# Patient Record
Sex: FEMALE | Race: WHITE | URBAN - METROPOLITAN AREA
[De-identification: names, ages, dates, MRNs, and addresses within clinical notes are randomized per-mention and may not be internally consistent; named-entity substitution may affect disease eponyms.]

---

## 2022-01-12 ENCOUNTER — MEDICAL CORRESPONDENCE (OUTPATIENT)
Dept: HEALTH INFORMATION MANAGEMENT | Facility: CLINIC | Age: 32
End: 2022-01-12

## 2022-01-15 ENCOUNTER — TRANSCRIBE ORDERS (OUTPATIENT)
Dept: OTHER | Age: 32
End: 2022-01-15

## 2022-01-15 DIAGNOSIS — E77.0: Primary | ICD-10-CM

## 2022-03-28 ENCOUNTER — TELEPHONE (OUTPATIENT)
Dept: CONSULT | Facility: CLINIC | Age: 32
End: 2022-03-28
Payer: COMMERCIAL

## 2022-03-28 ENCOUNTER — VIRTUAL VISIT (OUTPATIENT)
Dept: CONSULT | Facility: CLINIC | Age: 32
End: 2022-03-28
Attending: PEDIATRICS
Payer: COMMERCIAL

## 2022-03-28 ENCOUNTER — VIRTUAL VISIT (OUTPATIENT)
Dept: PEDIATRICS | Facility: CLINIC | Age: 32
End: 2022-03-28
Attending: GENETIC COUNSELOR, MS
Payer: COMMERCIAL

## 2022-03-28 DIAGNOSIS — R11.10 VOMITING, INTRACTABILITY OF VOMITING NOT SPECIFIED, PRESENCE OF NAUSEA NOT SPECIFIED, UNSPECIFIED VOMITING TYPE: ICD-10-CM

## 2022-03-28 DIAGNOSIS — E77.0: Primary | ICD-10-CM

## 2022-03-28 DIAGNOSIS — D23.9: ICD-10-CM

## 2022-03-28 DIAGNOSIS — E77.0: ICD-10-CM

## 2022-03-28 DIAGNOSIS — R04.0 EPISTAXIS: ICD-10-CM

## 2022-03-28 DIAGNOSIS — E75.21 FABRY DISEASE (H): Primary | ICD-10-CM

## 2022-03-28 DIAGNOSIS — Z31.5 ENCOUNTER FOR PROCREATIVE GENETIC COUNSELING: ICD-10-CM

## 2022-03-28 DIAGNOSIS — Z71.83 ENCOUNTER FOR NONPROCREATIVE GENETIC COUNSELING: ICD-10-CM

## 2022-03-28 DIAGNOSIS — R20.2 PARESTHESIA: ICD-10-CM

## 2022-03-28 DIAGNOSIS — R10.84 ABDOMINAL PAIN, GENERALIZED: ICD-10-CM

## 2022-03-28 PROCEDURE — 96040 HC GENETIC COUNSELING, EACH 30 MINUTES: CPT | Mod: GT,95 | Performed by: GENETIC COUNSELOR, MS

## 2022-03-28 PROCEDURE — 99204 OFFICE O/P NEW MOD 45 MIN: CPT | Mod: 95 | Performed by: PEDIATRICS

## 2022-03-28 NOTE — LETTER
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 She was referred for evaluation by Dr. Jessica Jaquez ([facility], Family Medicine with OB), and was seen virtually today at the [facility] by Dr. Jose Witt. Beryl was seen at today's video appointment with her spouse Rangel. I met with Beryl virtually today at the request of Dr. Witt to obtain family history, discuss possible genetic contributions to her symptoms, and to obtain informed consent for genetic testing.     Monalisa noted main questions today around her diagnosis and if she should have additional or repeat Fabry disease testing, and what management/follow up would be recommended for her son once he is born.    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 She notes foot burning/paresthesia starting in early childhood (~7 years of age) with multiple angiokeratomas with time. She also notes chronic GI symptoms including upset stomach and vomiting. 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 Past notes state normal GLA genetic testing and normal alpha galactosidase level testing from [facility] in ~2010, though we do not have copies for review. I will email a release of records form to Beryl to attempt to obtain copies. She previously followed with a hematologist/oncologist at [facility]. Beryl notes she has been fairly stable/not much changing the past 5 years. She was referred to our clinic partially due to her current pregnancy (MALIKA in 8/2022).     Beryl recently saw genetic counselor Anabelle Maza, [facility]. This 3/15/2022 clinic note is available through [employer].    Additional history includes bleeding gums and epistaxis. " Per past clinic notes full gene analysis of ENG and ACVRL1 was reportedly negative at [place]; report copies are unavailable for review.    See Dr. Witt note for full personal history details.     Family History:   A three generation pedigree was obtained today and sent to be scanned into the EMR. The family history was significant for the following:    Beryl is currently pregnant with a male, with MALIKA in 8/21/2022.    Beryl has one sister and one brother, who are healthy. They have no children at this time.  Beryl's mother has no major health concerns. Her mother has one brother and one sister. Her brother has a history of a developmental condition (exact diagnosis unknown). Beryl noted he was not thought to have a genetic condition, but additional information regarding his exact diagnosis and any testing done may give more information about any recurrence chance for other family members. If this is information can be determined, I encouraged Beryl to reach out. Beryl's mother's sister and her two children have no major health concerns.    Beryl's maternal grandmother has no major health concerns. Beryl's maternal grandfather has a history of COPD and heart failure at an older age.    Beryl's father has a history of hypertension. He has one sister. Her and her child have no major health concerns.    Beryl's paternal grandparents have no major health concerns.    The family history was otherwise negative for similar history, heart and kidney concerns, and known genetic conditions.     Beryl is of [place] ancestry on her maternal side and [place]/Citizen of [place] ancestry of her paternal side. Consanguinity was denied.    Discussion:   We discussed that genes are long stretches of DNA that are responsible for how our bodies look and how our bodies work.  Our genes are inherited on structures called chromosomes of which we have 23 pairs. We have two copies of chromosomes 1-22, and either two X chromosomes (females) or  "one X and one Y chromosome (males). One copy of each chromosome in a pair is inherited from the mother and one is inherited from the father. Changes in the DNA sequence of a gene, called mutations, as well as changes within the chromosomes can cause the signs and symptoms of a genetic condition.     Monalisa was well aware of the symptoms of Fabry disease, and they were therefore not reviewed by myself today. She also recently met with a genetic counselor at [facility] and denied the need for review of the genetics of Fabry disease, so they below genetic information was only briefly reviewed today.    We briefly reviewed that Fabry disease is a genetic condition caused by mutations in a gene called GLA which is found on the X-chromosome. This gene codes for a lysosomal protein called alpha-galactosidase A. This enzyme is normally responsible for breaking down a fatty substance called globotriaosylceramide in the lysosomes of cells. Mutations in the GLA gene, therefore, disrupt this degradation process and lead to the accumulation of globotriaosylceramide. This damages cells and can lead to the signs and symptoms of Fabry disease.      X-linked inheritance was briefly reviewed. We reviewed how women have two copies of the X-chromosome while men have one copy of the X-chromosome and one copy of the Y-chromosome. The GLA gene is found on the X-chromosome and therefore women have two copies of the GLA gene while men have just one. Because women have this \"back-up\" copy of the GLA gene, a GLA mutation does not typically affect females to the same degree as it does males.  The clinical manifestations in females with a single GLA mutation range from asymptomatic throughout a normal life span to as severe as affected males.  Whether or not a female experiences symptoms of Fabry disease and the severity of symptoms is at least partially determined by differences in X-inactivation. More severely affected females are more likely to " express the X chromosome with the GLA pathogenic variant in affected organs. Fabry diease therefore can be a quite variable condition.    Individuals with Fabry disease have a 50% chance of passing the GLA copy with a mutation onto each of their children. Specifically, for a female with Fabry disease, for each pregnancy there would be a 25% chance of a son who inherited the variant/condition, a 25% chance of having a daughter who inherited the variant, a 25% chance of having a son who did not inherit the variant, and a 25% chance of a daughter who did not inherit the variant. No one has control over which copy they pass on to their child since it is a random process.     Therefore, if Beryl's correct diagnosis is Fabry disease, her currently pregnancy would have a 50% chance for this diagnosis. Unfortunately, since a pathogenic variant to explain her symptoms has not been found to date, genetic testing for her son would be uninformative. Given this, Beryl had questions today about recommendations for any testing and management for her son. These questions were deferred to Dr. Witt. If diagnosis can be confirmed for Beryl, this will better help assess risk for her son to have the same type of issues (or more severe presentation) and allow for accurate testing for him.    Beryl reportedly had previous normal GLA genetic testing. However, all genetic testing has limitations and this result therefore does NOT fully rule out Fabry disease. Our genetic testing certainly may have improved in the past 12 years, and Dr. Witt therefore recommended updated GLA genetic testing for Beryl today. This testing would be ordered via the [facility] ([facility]) on their genome backbone (no cost difference, but rather a slightly different testing methodology), to give the highest chance of detecting a possible mutation. Genome sequencing includes testing both the protein-coding and  non-coding regions of the human genome, allowing for the potential detection of characterized/pathogenic variants in regions that are not assessed by traditional sequencing/likely would not have been assessed on past genetic testing.    Dr. Witt also ordered additional labs today for assessment of if Beryl has Fabry disease. See his notes for details on additional testing and results.    We discussed that the possible results for this genetic testing were:     Negative: meaning normal or no mutations are identified in the genes that were tested/sequenced. This reduces, but does not fully rule out Fabry diease for an individual.    Positive: meaning a mutation that is known to be associated with a particular set of symptoms is identified. This would be associated with a confirmed Fabry disease diagnosis, and targeted testing would be available for other family members.    Variant of uncertain significance (VUS): meaning a change in the DNA sequence of a particular gene was seen but it is not known if it explains the symptoms. If a VUS is detected, the laboratory may request a blood sample from other family members to help clarify an individual's test results. Usually more research/evidence over time is needed to determine a VUS's significance.    We discussed benefits of repeat genetic testing many years later for Beryl. First and foremost, this can be important for Beryl's own health. If diagnosis can be better confirmed or ruled out for Beryl, it would help clarify how to appropriately screen for and manage her. Knowing about any additional/associated health risks can help us stay ahead of her healthcare to more appropriately screen for and potentially prevent other complications. Secondly, clarify diagnosis would help confirm chance for other family members, including Beryl's son, to have similar history/diagnosis. If a GLA mutation can be found, targeted testing for all family members would be available to  clarify their risk for similar history and subsequent recommended management. Third, if a GLA mutation can be identified, different reproductive options (including prenatal testing) would be available if helpful for Beryl for her current and any future pregnancies. Finally, having a specific underlying diagnosis can sometimes help individuals receive the services they need to help reach their full potential in school, in work, or in day to day life. Given past genetic testing was 12 years ago and testing was uninformative at that time, GLA genetic testing via [employer] at this time is medically warranted for Beryl.     Limitations and risks of genetic testing were also discussed, including that our genetic testing in 2022 is only as good as our current knowledge of genetics and genes. Therefore, a negative test result does not rule out a genetic condition and other follow up may be needed, pending work up from Dr. Witt.     Beryl elected to proceed with the recommended GLA genetic testing via [facility], pending insurance coverage. Consent for genetic testing was obtained verbally and sent to be scanned into Beryl's chart. Blood will be drawn with her other labs in the future (she will schedule a lab appointment for sample draw) and sent to the [facility]. Once the prior authorization is complete, Beryl will be called and if she is comfortable with the coverage information, testing will be initiated. Results from there will take up to 4-6 weeks. Once available, I will call Beryl with genetic testing results. Follow-up for Beryl will depend on all her testing results, as according to Dr. Witt.    Lab results may be automatically released via [employer].  Department protocol is to hold genetic testing results until we have reviewed them. We will then contact the family directly to disclose the results and ensure they receive a copy of the report. This protocol was reviewed with Beryl,  who was in agreement to hold the results for genetics review and direct contact.      It was a pleasure to meet with Monalisa today. She had no additional questions at this time. Contact information was shared for any future questions or concerns that arise.    Plan:   1. Genetics of Fabry disease and Beryl's past genetic testing implications have been discussed previously with her and therefore were only briefly reviewed today.  2. Additional genetic testing of the GLA gene via [facility] was ordered. Blood was drawn in the future (Beryl will arrange a lab visit).  3. The lab will contact Beryl with prior authorization information from insurance when available. If comfortable with coverage information, testing will begin.  4. Genetic testing results will return from there in several weeks, at which time I will call Beryl.  5. Follow up as according to Dr. Witt and pending results.  6. Dr. Witt also ordered additional labs; see his notes for details and results of those labs when available.  7. Contact information was provided should any questions arise in the future.     Mary Isabel MS, [facility]  Genetic Counselor  Division of Genetics and Metabolism  [facility]   Phone: [phone]  Pager: [phone]      Approximate Time Spent in Consultation (part video and part telephone call): 30 minutes   CC: PCP; Dr. Jaquez; Dr. Witt; Anabelle Maza CGC

## 2022-03-28 NOTE — PATIENT INSTRUCTIONS
Genetics  [facility]     Contact our nurse care coordinator Lacie MILLERN, RN, PHN at [phone] or send a [employer] message for any non-urgent general or medical questions.     If you had genetic testing and have further questions, please contact the genetic counselor:    Mary Isabel  Ph: [phone]    To schedule appointments:  Pediatric Call Center for [facility]: [phone]  Neuropsychology [phone]   Radiology/ Imaging/Echocardiogram: [phone]   Services:   [phone]     You should receive a phone call about your next appointment. If you do not receive this within two weeks of your visit, please call [phone].     IF REFERRALS WERE PLACED/ DISCUSSED DURING THE VISIT, PLEASE LET OUR TEAM KNOW IF YOU DO NOT HEAR FROM THE SCHEDULERS IN 2 WEEKS    If you have not already done so consider signing up for [email or web address] by speaking with the person at the  on your way out or go to [email or web address] to sign up online.     [email or web address] enables easy and confidential communication with your care team.

## 2022-03-28 NOTE — LETTER
"  3/28/2022      RE: Beryl Glover  [address] [address]                     [facility]  [facility] [address] [place] [address]   Phone: [phone]  Fax: [phone]  Date: 2022      Patient:  Beryl Glover   :   1990   MRN:     [id number]      Beryl Glovre  [address] [address]    Dear  No primary care provider on file. and Beryl Glover,    Thank you for sending Beryl Glover to the [facility] Monday \"[facility]\" for consultation and treatment of:    Fabry disease    PAST MEDICAL HISTORY:    From the oral history, and medical records that are available, these items are noted:    There is no problem list on file for this patient.    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 Beryl is accompanied by her , Mr. Multani today. This is Beryl s first visit to [facility]. She was referred by Jessica Jaquez MD from [facility] to  at [facility] for the management of Fabry disease. 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 Beryl mentions that she has mild symptoms of Fabry disease up to this point in her life that do not interfere with her daily life. Beryl is not receiving any Fabry medication, and no biomarkers are available. She also reports that she is pregnant, and her MALIKA is 2022.      Medications:  Current Outpatient Medications   Medication Sig     Prenatal Vit-Fe Fumarate-FA (PRENATAL MULTIVITAMIN  PLUS IRON) 27-1 MG TABS Take by mouth daily     No current facility-administered medications for this visit.       Allergies:  No Known Allergies    Physical Examination:  There were no vitals taken for this visit.  Weight %tile:Facility age limit for growth percentiles is 20 years.  Height %tile: Facility age limit for growth percentiles is 20 years.  Head " Circumference %tile: Facility age limit for growth percentiles is 20 years.  BMI %tile: No height and weight on file for this encounter.    FAMILY HISTORY: A brief family medical history was reviewed.  REVIEW OF SYSTEMS: The review of systems negative for new eye, ear, heart, lung, liver, spleen, gastrointestinal, bone, muscle, integumentary, endocrinologic, brain or psychiatric issues except as noted above.  PHYSICAL EXAMINATION:   General: The patient is oriented to person, place and time at an age-appropriate manner.   HEENT: The facial features are normal and symmetric. The ears are of normal position and configuration and hearing is grossly normal.  The tongue protrudes normally without fasciculations.  Neck: The neck  appears to be supple with full range of motion  Chest: The chest is of normal configuration. There is no tachypnea or other visible abnormalies.  Heart: The patient appears to be well perfused, and in no apparent distress.  Abdomen: Not examined.  Extremities: The extremities are of normal configuration.  Back: Not examined,  Integument: The  visible portion of the integument is  of normal appearance without significant changes in pigmentation, birthmarks, or lesions.  Neuromuscular:  Mental Status Exam: Alert, awake. Fully oriented. No dysarthria; speech of normal fluency.  Cranial Nerves: EOMs intact, no nystagmus, facial movements symmetric.   Motor: There appears to be normal movement in all four extremities, no atrophy or fasciculations. No tremors.  Gait: By visual examination, there appears to be normal gait; normal arm swing and stance.    LABORATORY RESULTS: Laboratory studies from the past year were reviewed.    ASSESSMENT:  1. Clinical diagnosis of Fabry disease in 2010  2. No Fabrazyme ERT infusion or [place]  3. Presence of distal extremity paresthesia  4. Presence of GI issues with stomach pain  5. 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 No headache  7. No Fabry  biomarkers available  8. No Kidney, heart, or eye involvement  9. At risk for cardiomyopathy and myocardial infarction  10. At risk for stroke     PLAN/RECOMMENDATIONS:  1. Check Alpha galactosidase enzyme assay  2. DNA sequence analysis to find out GLA mutation  3. Recommend obtaining Fabry biomarkers, plasma GL3 and LysoGL3  4. Check urine for oligosaccharides to find out Fucosidosis and Mannosidosis  5. Recommend continue follow up with maternal-fetal-medicine (MFM)  6. Individuals with Fabry disease are at risk for stroke and MI due to their underlying disease. It is highly important to reduce the additional risk related to hypercholesterolemia.  7. Regular cardiology evaluation with Echocardiogram and EKG every 1-2 years  8. Recommend annual evaluation with Dr. Nahum Thompson (nephrology) for evaluation of renal function  9. Pharmacotherapy Consultation for Rare Metabolic Diseases, Richard BarrD  10. Return to [facility] in 10 weeks     FOLLOW-UP INSTRUCTIONS FOR THE PATIENT:  If you are returning to clinic to review specific laboratory tests, please call the Genetic Counselor (see phone numbers below)  to confirm that we have received all of the results from reference laboratories prior to your appointment. If we have not received all of the test results, please discuss re-scheduling your appointment.    With warmest regards,      Mukesh Witt Ph.D., M.D.  Professor of Pediatrics  Medical Director, [facility] Program  Medical Director, PKU and Maternal PKU Clinic    Appointments: [phone]      Monday mornings: [facility] for Lysosomal Diseases Clinic   Monday afternoons: PKU Clinic, Metabolism Clinic, and Genetics Clinic              [facility] laboratory: [phone]/ [phone]               [facility] laboratory: [phone]  Nurse Coordinator, Metabolism and Genetics:  Lacie Tom RN, [phone]    Pharmacotherapy  Consultant:  Richard Quiroga, PharmD, Pharmacotherapy for Metabolic Disorders (PIMD): [phone]    Genetic Counselor:  Jocy Restrepo MS, [facility] (Genetic test Results): [phone]    Metabolic Dietician:  Lisa Rodriguez, Registered Dietician: [phone]    [facility] Scheduler:  Khloe Dodson, [phone]    Copies to:     Beryl Glover  [address] [address]    Dr. Jessica Jaquez MD  [address] [address]

## 2022-03-28 NOTE — PROGRESS NOTES
"Monalisa is a 31 year old who is being evaluated via a billable video visit.      How would you like to obtain your AVS? MyChart  If the video visit is dropped, the invitation should be resent by: Send to e-mail at: [email or web address]  Will anyone else be joining your video visit? Yes. Dr.Todd Quiroga, PharmD. (Pharmacist) and EVERT Trotter. (Research Physician)                      [facility] [address] [address]   Phone: [phone]  Fax: [phone]  Date: 2022      Patient:  Beryl Glover   :   1990   MRN:     [id number]      Beryl Glover  [address] [address]    Dear Dr. Helton primary care provider on file. and Beryl AUSTIN Belen,    Thank you for sending Beryl Glover to the [facility] Monday \"[facility]\" for consultation and treatment of:    Fabry disease    PAST MEDICAL HISTORY:    From the oral history, and medical records that are available, these items are noted:    There is no problem list on file for this patient.    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 Beryl is accompanied by her , Mr. Multani today. This is Beryl s first visit to [facility]. She was referred by Jessica Jaquez MD from [facility] to  at [facility] for the management of Fabry disease. 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 Beryl mentions that she has mild symptoms of Fabry disease up to this point in her life that do not interfere with her daily life. Beryl is not receiving any Fabry medication, and no biomarkers are available. She also reports that she is pregnant, and her MALIKA is 2022.      Medications:  Current Outpatient Medications   Medication Sig     Prenatal Vit-Fe Fumarate-FA (PRENATAL MULTIVITAMIN  PLUS IRON) 27-1 MG TABS Take by mouth daily     No " current facility-administered medications for this visit.       Allergies:  No Known Allergies    Physical Examination:  There were no vitals taken for this visit.  Weight %tile:Facility age limit for growth percentiles is 20 years.  Height %tile: Facility age limit for growth percentiles is 20 years.  Head Circumference %tile: Facility age limit for growth percentiles is 20 years.  BMI %tile: No height and weight on file for this encounter.    FAMILY HISTORY: A brief family medical history was reviewed.  REVIEW OF SYSTEMS: The review of systems negative for new eye, ear, heart, lung, liver, spleen, gastrointestinal, bone, muscle, integumentary, endocrinologic, brain or psychiatric issues except as noted above.  PHYSICAL EXAMINATION:   General: The patient is oriented to person, place and time at an age-appropriate manner.   HEENT: The facial features are normal and symmetric. The ears are of normal position and configuration and hearing is grossly normal.  The tongue protrudes normally without fasciculations.  Neck: The neck  appears to be supple with full range of motion  Chest: The chest is of normal configuration. There is no tachypnea or other visible abnormalies.  Heart: The patient appears to be well perfused, and in no apparent distress.  Abdomen: Not examined.  Extremities: The extremities are of normal configuration.  Back: Not examined,  Integument: The  visible portion of the integument is  of normal appearance without significant changes in pigmentation, birthmarks, or lesions.  Neuromuscular:  Mental Status Exam: Alert, awake. Fully oriented. No dysarthria; speech of normal fluency.  Cranial Nerves: EOMs intact, no nystagmus, facial movements symmetric.   Motor: There appears to be normal movement in all four extremities, no atrophy or fasciculations. No tremors.  Gait: By visual examination, there appears to be normal gait; normal arm swing and stance.    LABORATORY RESULTS: Laboratory studies from  the past year were reviewed.    ASSESSMENT:  1. Clinical diagnosis of Fabry disease in 2010  2. No Fabrazyme ERT infusion or Gal[place]old  3. Presence of distal extremity paresthesia  4. Presence of GI issues with stomach pain  5. 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 No headache  7. No Fabry biomarkers available  8. No Kidney, heart, or eye involvement  9. At risk for cardiomyopathy and myocardial infarction  10. At risk for stroke     PLAN/RECOMMENDATIONS:  1. Check Alpha galactosidase enzyme assay  2. DNA sequence analysis to find out GLA mutation  3. Recommend obtaining Fabry biomarkers, plasma GL3 and LysoGL3  4. Check urine for oligosaccharides to find out Fucosidosis and Mannosidosis  5. Recommend continue follow up with maternal-fetal-medicine (MFM)  6. Individuals with Fabry disease are at risk for stroke and MI due to their underlying disease. It is highly important to reduce the additional risk related to hypercholesterolemia.  7. Regular cardiology evaluation with Echocardiogram and EKG every 1-2 years  8. Recommend annual evaluation with Dr. Nahum Thompson (nephrology) for evaluation of renal function  9. Pharmacotherapy Consultation for Rare Metabolic Diseases, Richard BarrD  10. Return to [facility] in 10 weeks     FOLLOW-UP INSTRUCTIONS FOR THE PATIENT:  If you are returning to clinic to review specific laboratory tests, please call the Genetic Counselor (see phone numbers below)  to confirm that we have received all of the results from reference laboratories prior to your appointment. If we have not received all of the test results, please discuss re-scheduling your appointment.    With warmest regards,      Mukesh Witt Ph.D., M.D.  Professor of Pediatrics  Medical Director, [facility]  Medical Director, PKU and Maternal PKU Clinic    Appointments: [phone]      Monday mornings: [facility] for Lysosomal Diseases Clinic   Monday  afternoons: PKU Clinic, Metabolism Clinic, and Genetics Clinic              [facility] laboratory: [phone]/ [phone]               [facility] laboratory: [phone]  Nurse Coordinator, Metabolism and Genetics:  Lacie Tom, RN, [phone]    Pharmacotherapy Consultant:  Richard Quiroga, PharmD, Pharmacotherapy for Metabolic Disorders (PIMD): [phone]    Genetic Counselor:  Jocy Restrepo MS, [facility] (Genetic test Results): [phone]    Metabolic Dietician:  Lisa Rodriguez, Registered Dietician: [phone]    [facility] Scheduler:  Khloe Dodson, [phone]    Copies to:     Beryl Glover  [address] [facility] [address]    Dr. Jessica Jaquze MD  [facility]  [address] [place] [address]

## 2022-03-28 NOTE — TELEPHONE ENCOUNTER
Dr. Witt would like to have a follow up virtual visit with Beryl for sometime in June. Beryl can reach me at [phone] and I can help get her on the schedule to meet with Dr. Witt.       Thank you,   Khloe Dodson

## 2022-04-03 ENCOUNTER — HEALTH MAINTENANCE LETTER (OUTPATIENT)
Age: 32
End: 2022-04-03

## 2022-04-05 ENCOUNTER — LAB (OUTPATIENT)
Dept: LAB | Facility: CLINIC | Age: 32
End: 2022-04-05
Payer: COMMERCIAL

## 2022-04-05 DIAGNOSIS — D23.9: ICD-10-CM

## 2022-04-05 DIAGNOSIS — R20.2 PARESTHESIA: ICD-10-CM

## 2022-04-05 DIAGNOSIS — E77.0: ICD-10-CM

## 2022-04-05 DIAGNOSIS — R04.0 EPISTAXIS: ICD-10-CM

## 2022-04-05 DIAGNOSIS — R11.10 VOMITING, INTRACTABILITY OF VOMITING NOT SPECIFIED, PRESENCE OF NAUSEA NOT SPECIFIED, UNSPECIFIED VOMITING TYPE: ICD-10-CM

## 2022-04-05 DIAGNOSIS — R10.84 ABDOMINAL PAIN, GENERALIZED: ICD-10-CM

## 2022-04-05 LAB
ALBUMIN SERPL-MCNC: 2.7 G/DL (ref 3.4–5)
ALP SERPL-CCNC: 55 U/L (ref 40–150)
ALT SERPL W P-5'-P-CCNC: 46 U/L (ref 0–50)
ANION GAP SERPL CALCULATED.3IONS-SCNC: 9 MMOL/L (ref 3–14)
AST SERPL W P-5'-P-CCNC: 27 U/L (ref 0–45)
BILIRUB SERPL-MCNC: 0.2 MG/DL (ref 0.2–1.3)
BUN SERPL-MCNC: 5 MG/DL (ref 7–30)
CALCIUM SERPL-MCNC: 8.8 MG/DL (ref 8.5–10.1)
CHLORIDE BLD-SCNC: 108 MMOL/L (ref 94–109)
CO2 SERPL-SCNC: 21 MMOL/L (ref 20–32)
CREAT SERPL-MCNC: 0.48 MG/DL (ref 0.52–1.04)
GFR SERPL CREATININE-BSD FRML MDRD: >90 ML/MIN/1.73M2
GLUCOSE BLD-MCNC: 74 MG/DL (ref 70–99)
HOLD SPECIMEN: NORMAL
INTERPRETATION: NORMAL
INTERPRETATION: NORMAL
LAB PDF RESULT: NORMAL
POTASSIUM BLD-SCNC: 4.2 MMOL/L (ref 3.4–5.3)
PROT SERPL-MCNC: 6.8 G/DL (ref 6.8–8.8)
SIGNIFICANT RESULTS: NORMAL
SODIUM SERPL-SCNC: 138 MMOL/L (ref 133–144)
SPECIMEN DESCRIPTION: NORMAL
TEST DETAILS, MDL: NORMAL

## 2022-04-05 PROCEDURE — 36415 COLL VENOUS BLD VENIPUNCTURE: CPT

## 2022-04-05 PROCEDURE — 80053 COMPREHEN METABOLIC PANEL: CPT

## 2022-04-05 PROCEDURE — 84377 SUGARS MULTIPLE QUAL: CPT

## 2022-04-05 PROCEDURE — 84999 UNLISTED CHEMISTRY PROCEDURE: CPT

## 2022-04-05 NOTE — PROGRESS NOTES
"Presenting information:   Beryl \"Monalisa\" Belen is a 31 year old female with a history of possible Fabry disease. She was referred for evaluation by Dr. Jessica Jaquez (, Family Medicine with OB), and was seen virtually today at the [facility] by Dr. Jose Witt. Beryl was seen at today's video appointment with her spouse Rangel. I met with Beryl virtually today at the request of Dr. Witt to obtain family history, discuss possible genetic contributions to her symptoms, and to obtain informed consent for genetic testing.     Monalisa noted main questions today around her diagnosis and if she should have additional or repeat Fabry disease testing, and what management/follow up would be recommended for her son once he is born.    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 She notes foot burning/paresthesia starting in early childhood (~7 years of age) with multiple angiokeratomas with time. She also notes chronic GI symptoms including upset stomach and vomiting. Beryl notes clinical diagnosis of Fabry disease was made at some point around 2010, possibly at [facility]. Past notes state normal GLA genetic testing and normal alpha galactosidase level testing from [facility] in ~2010, though we do not have copies for review. I will email a release of records form to Beryl to attempt to obtain copies. She previously followed with a hematologist/oncologist at [facility]. Beryl notes she has been fairly stable/not much changing the past 5 years. She was referred to our clinic partially due to her current pregnancy (MALIKA in 8/2022).     Beryl recently saw genetic counselor Anabelle Maza, [facility] within [facility]. This 3/15/2022 clinic note is available through [facility].    Additional history includes bleeding gums and epistaxis. Per past clinic notes full gene analysis of ENG and ACVRL1 was " reportedly negative at [place]; report copies are unavailable for review.    See Dr. Witt note for full personal history details.     Family History:   A three generation pedigree was obtained today and sent to be scanned into the EMR. The family history was significant for the following:    Beryl is currently pregnant with a male, with MALIKA in 8/21/2022.    Beryl has one sister and one brother, who are healthy. They have no children at this time.  Beryl's mother has no major health concerns. Her mother has one brother and one sister. Her brother has a history of a developmental condition (exact diagnosis unknown). Beryl noted he was not thought to have a genetic condition, but additional information regarding his exact diagnosis and any testing done may give more information about any recurrence chance for other family members. If this is information can be determined, I encouraged Beryl to reach out. Beryl's mother's sister and her two children have no major health concerns.    Beryl's maternal grandmother has no major health concerns. Beryl's maternal grandfather has a history of COPD and heart failure at an older age.    Beryl's father has a history of hypertension. He has one sister. Her and her child have no major health concerns.    Beryl's paternal grandparents have no major health concerns.    The family history was otherwise negative for similar history, heart and kidney concerns, and known genetic conditions.     Beryl is of Turkmen/Ecu[place] ancestry on her maternal side and [place]oz[place] ancestry of her paternal side. Consanguinity was denied.    Discussion:   We discussed that genes are long stretches of DNA that are responsible for how our bodies look and how our bodies work.  Our genes are inherited on structures called chromosomes of which we have 23 pairs. We have two copies of chromosomes 1-22, and either two X chromosomes (females) or one X and one Y chromosome (males). One copy of each  "chromosome in a pair is inherited from the mother and one is inherited from the father. Changes in the DNA sequence of a gene, called mutations, as well as changes within the chromosomes can cause the signs and symptoms of a genetic condition.     Monalisa was well aware of the symptoms of Fabry disease, and they were therefore not reviewed by myself today. She also recently met with a genetic counselor at [facility] and denied the need for review of the genetics of Fabry disease, so they below genetic information was only briefly reviewed today.    We briefly reviewed that Fabry disease is a genetic condition caused by mutations in a gene called GLA which is found on the X-chromosome. This gene codes for a lysosomal protein called alpha-galactosidase A. This enzyme is normally responsible for breaking down a fatty substance called globotriaosylceramide in the lysosomes of cells. Mutations in the GLA gene, therefore, disrupt this degradation process and lead to the accumulation of globotriaosylceramide. This damages cells and can lead to the signs and symptoms of Fabry disease.      X-linked inheritance was briefly reviewed. We reviewed how women have two copies of the X-chromosome while men have one copy of the X-chromosome and one copy of the Y-chromosome. The GLA gene is found on the X-chromosome and therefore women have two copies of the GLA gene while men have just one. Because women have this \"back-up\" copy of the GLA gene, a GLA mutation does not typically affect females to the same degree as it does males.  The clinical manifestations in females with a single GLA mutation range from asymptomatic throughout a normal life span to as severe as affected males.  Whether or not a female experiences symptoms of Fabry disease and the severity of symptoms is at least partially determined by differences in X-inactivation. More severely affected females are more likely to express the X chromosome with the GLA pathogenic " variant in affected organs. Fabry diease therefore can be a quite variable condition.    Individuals with Fabry disease have a 50% chance of passing the GLA copy with a mutation onto each of their children. Specifically, for a female with Fabry disease, for each pregnancy there would be a 25% chance of a son who inherited the variant/condition, a 25% chance of having a daughter who inherited the variant, a 25% chance of having a son who did not inherit the variant, and a 25% chance of a daughter who did not inherit the variant. No one has control over which copy they pass on to their child since it is a random process.     Therefore, if Beryl's correct diagnosis is Fabry disease, her currently pregnancy would have a 50% chance for this diagnosis. Unfortunately, since a pathogenic variant to explain her symptoms has not been found to date, genetic testing for her son would be uninformative. Given this, Beryl had questions today about recommendations for any testing and management for her son. These questions were deferred to Dr. Witt. If diagnosis can be confirmed for Beryl, this will better help assess risk for her son to have the same type of issues (or more severe presentation) and allow for accurate testing for him.    Beryl reportedly had previous normal GLA genetic testing. However, all genetic testing has limitations and this result therefore does NOT fully rule out Fabry disease. Our genetic testing certainly may have improved in the past 12 years, and Dr. Witt therefore recommended updated GLA genetic testing for Beryl today. This testing would be ordered via the [facility] ([facility]) on their genome backbone (no cost difference, but rather a slightly different testing methodology), to give the highest chance of detecting a possible mutation. Genome sequencing includes testing both the protein-coding and non-coding regions of the human genome, allowing for  the potential detection of characterized/pathogenic variants in regions that are not assessed by traditional sequencing/likely would not have been assessed on past genetic testing.    Dr. Witt also ordered additional labs today for assessment of if Beryl has Fabry disease. See his notes for details on additional testing and results.    We discussed that the possible results for this genetic testing were:     Negative: meaning normal or no mutations are identified in the genes that were tested/sequenced. This reduces, but does not fully rule out Fabry diease for an individual.    Positive: meaning a mutation that is known to be associated with a particular set of symptoms is identified. This would be associated with a confirmed Fabry disease diagnosis, and targeted testing would be available for other family members.    Variant of uncertain significance (VUS): meaning a change in the DNA sequence of a particular gene was seen but it is not known if it explains the symptoms. If a VUS is detected, the laboratory may request a blood sample from other family members to help clarify an individual's test results. Usually more research/evidence over time is needed to determine a VUS's significance.    We discussed benefits of repeat genetic testing many years later for Beryl. First and foremost, this can be important for Beryl's own health. If diagnosis can be better confirmed or ruled out for Beryl, it would help clarify how to appropriately screen for and manage her. Knowing about any additional/associated health risks can help us stay ahead of her healthcare to more appropriately screen for and potentially prevent other complications. Secondly, clarify diagnosis would help confirm chance for other family members, including Beryl's son, to have similar history/diagnosis. If a GLA mutation can be found, targeted testing for all family members would be available to clarify their risk for similar history and subsequent  recommended management. Third, if a GLA mutation can be identified, different reproductive options (including prenatal testing) would be available if helpful for Beryl for her current and any future pregnancies. Finally, having a specific underlying diagnosis can sometimes help individuals receive the services they need to help reach their full potential in school, in work, or in day to day life. Given past genetic testing was 12 years ago and testing was uninformative at that time, GLA genetic testing via [employer] at this time is medically warranted for Beryl.     Limitations and risks of genetic testing were also discussed, including that our genetic testing in 2022 is only as good as our current knowledge of genetics and genes. Therefore, a negative test result does not rule out a genetic condition and other follow up may be needed, pending work up from Dr. Witt.     Beryl elected to proceed with the recommended GLA genetic testing via [facility], pending insurance coverage. Consent for genetic testing was obtained verbally and sent to be scanned into Beryl's chart. Blood will be drawn with her other labs in the future (she will schedule a lab appointment for sample draw) and sent to the [facility] Diagnostics Laboratory. Once the prior authorization is complete, Beryl will be called and if she is comfortable with the coverage information, testing will be initiated. Results from there will take up to 4-6 weeks. Once available, I will call Beryl with genetic testing results. Follow-up for Beryl will depend on all her testing results, as according to Dr. Witt.    Lab results may be automatically released via [employer].  Department protocol is to hold genetic testing results until we have reviewed them. We will then contact the family directly to disclose the results and ensure they receive a copy of the report. This protocol was reviewed with Beryl, who was in agreement to hold the results for genetics  review and direct contact.      It was a pleasure to meet with Monalisa today. She had no additional questions at this time. Contact information was shared for any future questions or concerns that arise.    Plan:   1. Genetics of Fabry disease and Beryl's past genetic testing implications have been discussed previously with her and therefore were only briefly reviewed today.  2. Additional genetic testing of the GLA gene via [facility] was ordered. Blood was drawn in the future (Beryl will arrange a lab visit).  3. The lab will contact Beryl with prior authorization information from insurance when available. If comfortable with coverage information, testing will begin.  4. Genetic testing results will return from there in several weeks, at which time I will call Beryl.  5. Follow up as according to Dr. Witt and pending results.  6. Dr. Witt also ordered additional labs; see his notes for details and results of those labs when available.  7. Contact information was provided should any questions arise in the future.     Mary Isabel MS, [facility]  Genetic Counselor  Division of Genetics and Metabolism  [facility]   Phone: [phone]  Pager: [phone]      Approximate Time Spent in Consultation (part video and part telephone call): 30 minutes   CC: PCP; Dr. Jaquez; Dr. Witt; Anabelle Maza, KRISTI

## 2022-04-06 LAB
Lab: NORMAL
PERFORMING LABORATORY: NORMAL
SPECIMEN STATUS: NORMAL
TEST NAME: NORMAL

## 2022-04-13 ENCOUNTER — TELEPHONE (OUTPATIENT)
Dept: LAB | Facility: CLINIC | Age: 32
End: 2022-04-13

## 2022-04-13 LAB — MAYO MISC RESULT: NORMAL

## 2022-04-13 NOTE — PROGRESS NOTES
Notified Monalisa that no prior authorization is required for genetic testing. Explained there's no option to guarantee coverage of testing upfront by insurance.    Explained that insurance benefits may still apply, therefore, there could be an out of pocket cost. However, she was aware that insurance may not cover the cost of testing and would be responsible for the billed amount.     Monalisa expressed understanding and stated that she wants to proceed with testing. We will call when results are available. She had no further questions. [facility] Hold For Preauthorization: [[id number]] order was placed by a genetics provider.      Candice Jacob  Genomics Billing    [facility]   Molecular Diagnostics Laboratory  [address] [place] [id number]  [phone]

## 2022-04-15 LAB
SCANNED LAB RESULT: NORMAL
SCANNED LAB RESULT: NORMAL

## 2022-04-19 ENCOUNTER — LAB (OUTPATIENT)
Dept: LAB | Facility: CLINIC | Age: 32
End: 2022-04-19
Payer: COMMERCIAL

## 2022-04-19 DIAGNOSIS — R20.2 PARESTHESIA: ICD-10-CM

## 2022-04-19 DIAGNOSIS — E77.0: Primary | ICD-10-CM

## 2022-04-19 DIAGNOSIS — R11.10 VOMITING, INTRACTABILITY OF VOMITING NOT SPECIFIED, PRESENCE OF NAUSEA NOT SPECIFIED, UNSPECIFIED VOMITING TYPE: ICD-10-CM

## 2022-04-19 DIAGNOSIS — D23.9: ICD-10-CM

## 2022-04-19 DIAGNOSIS — R04.0 EPISTAXIS: ICD-10-CM

## 2022-04-19 DIAGNOSIS — R10.84 ABDOMINAL PAIN, GENERALIZED: ICD-10-CM

## 2022-04-19 PROCEDURE — 81405 MOPATH PROCEDURE LEVEL 6: CPT | Performed by: PEDIATRICS

## 2022-04-19 PROCEDURE — 81479 UNLISTED MOLECULAR PATHOLOGY: CPT | Performed by: PEDIATRICS

## 2022-04-19 PROCEDURE — G0452 MOLECULAR PATHOLOGY INTERPR: HCPCS | Mod: 26 | Performed by: PATHOLOGY

## 2022-04-20 ENCOUNTER — TELEPHONE (OUTPATIENT)
Dept: PEDIATRICS | Facility: CLINIC | Age: 32
End: 2022-04-20
Payer: COMMERCIAL

## 2022-04-20 NOTE — TELEPHONE ENCOUNTER
I called Monalisa to discuss her Fabry disease genetic testing, which has returned. We reviewed that this testing was the GLA gene (on genome backbone) from the [facility].     We discussed this testing returned negative/normal, meaning no pathogenic variants were identified in these genes. No variants of uncertain significance were identified either.  Therefore, a specific genetic explanation for Beryl's history was not found on this genetic testing.      We discussed that this result greatly reduces the chance that Monalisa has Fabry disease. However, all genetic testing has limitations. Therefore, we discussed that this result does % rule out, though does make it unlikely, that one has Fabry disease.    Dr. Witt also ordered biochemical labs for Beryl. Though these labs are outside my expertise, my understanding is these are also normal, likely further making Fabry disease unlikely.    Monalisa has an appointment scheduled with Dr. Duque for June. At that time he can weigh in about follow up in the context of the above results.    Given that a GLA genetic change was not found for Beryl, informative Fabry genetic testing for her son would not be available. Biochemical testing may be recommended for him to help assess for Fabry disease, though I defer to Dr. Witt for if/when this would be recommended for him.     Monalisa had no other questions at this time and was appreciative of the update. My number was previously given for questions/concerns. A copy of results and summary of our discussion will be sent by mail.        Mary Isabel MS, [facility]  Genetic Counseling  Genetics and Metabolism Division  [facility]   Phone: [phone]  Pager: [phone]  Email: [email or web address]

## 2022-06-13 ENCOUNTER — VIRTUAL VISIT (OUTPATIENT)
Dept: CONSULT | Facility: CLINIC | Age: 32
End: 2022-06-13
Attending: PEDIATRICS
Payer: COMMERCIAL

## 2022-06-13 DIAGNOSIS — E75.21 FABRY DISEASE (H): Primary | ICD-10-CM

## 2022-06-13 PROCEDURE — 99213 OFFICE O/P EST LOW 20 MIN: CPT | Performed by: PEDIATRICS

## 2022-06-13 NOTE — PROGRESS NOTES
"Beryl Glover  is being evaluated via a billable video visit.      How would you like to obtain your AVS? imagine  For the video visit, send the invitation by: Text to cell phone: [phone]  Will anyone else be joining your video visit? Dr. Richard Quiroga (Clinical Pharmacist), Dr. Guera Poe (Research physician) and Dr. Denis Burch (Clinical physician)      Lacie Denney     START: 926a  END:                        Advanced Therapies  [facility] [address]  [address] [place] [address]   Phone: [phone]  Fax: [phone]  Date: 2022      Patient:  Beryl Glover   :   1990   MRN:     [id number]      Beryl Glover  [address] [facility] [address]    Dear Beryl AUSTIN Belen,    Thank you for sending Beryl Glover to the [facility] Monday \"Advanced Therapies Clinic\" for consultation and treatment of:    Fabry disease    PAST MEDICAL HISTORY:    From the oral history, and medical records that are available, these items are noted:    There is no problem list on file for this patient.      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 Beryl mentions that she has mild symptoms of Fabry disease up to this point in her life that do not interfere with her daily life. Beryl is not receiving any Fabry medication, and no biomarkers are available. She also reports that she is pregnant, and her MALIKA is 2022.  She reports pain and tingling in both of her feet in the evening about once per month and cramping and discomfort after eating everyday.  A genetic report shows no variations of unknown significance (VUS) or pathogenic variants are present.    Medications:  Current Outpatient Medications   Medication Sig     Prenatal Vit-Fe Fumarate-FA (PRENATAL MULTIVITAMIN  PLUS IRON) 27-1 MG TABS Take by mouth daily     No current facility-administered " medications for this visit.       Allergies:  No Known Allergies    Physical Examination:  There were no vitals taken for this visit.  Weight %tile:Facility age limit for growth percentiles is 20 years.  Height %tile: Facility age limit for growth percentiles is 20 years.  Head Circumference %tile: Facility age limit for growth percentiles is 20 years.  BMI %tile: No height and weight on file for this encounter.    PHYSICAL EXAMINATION:   General: The patient is oriented to person, place and time at an age-appropriate manner.   HEENT: The facial features are normal and symmetric. The ears are of normal position and configuration and hearing is grossly normal.  Neck: The neck appears to be supple with full range of motion  Chest: Does not appear to be tachypneic or in any respiratory distress.  Heart:  Beryl Glover  appears well perfused.  Abdomen: Not examined.  Extremities: The extremities are of normal configuration without contractures nor hyperlaxities.  Back: Not examined.   Integument: The visible part of the integument is of normal appearance without significant changes in pigmentation, birthmarks, or lesions.  Neuromuscular:  Mental Status Exam: Alert, awake. Fully oriented. No dysarthria, no dysphasia. Speech of normal fluency.  Appears to have normal tone and strength.     LABORATORY RESULTS: Laboratory studies from the past year were reviewed.     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 Biomarkers within normal ranges  3. Peripheral neuropathy       PLAN/RECOMMENDATIONS:  1. Discussed introns and exons  2. Once baby is born, obtain genetic testing, enzyme activity level and GL3 level  3. Follow up in 12 months      FOLLOW-UP INSTRUCTIONS FOR THE PATIENT:  If you are returning to clinic to review specific laboratory tests, please call the Genetic Counselor (see phone  numbers below)  to confirm that we have received all of the results from reference laboratories prior to your appointment. If we have not received all of the test results, please discuss re-scheduling your appointment.    With warmest regards,      Mukesh Witt Ph.D., M.D.  Professor of Pediatrics  Medical Director, Advanced Therapies Program  Medical Director, PKU and Maternal PKU Clinic    Appointments: [phone]      Monday mornings: Advanced Therapies for Lysosomal Diseases Clinic   Monday afternoons: PKU Clinic, Metabolism Clinic, and Genetics Clinic              [facility] laboratory: [phone]/ [phone]               [facility] laboratory: [phone]  Nurse Coordinator, Metabolism and Genetics:  Lacie Tom RN, [phone]    Pharmacotherapy Consultant:  Richard Quiroga, PharmD, Pharmacotherapy for Metabolic Disorders (PIMD): [phone]    Genetic Counselor:  Jocy Restrepo MS, [facility] (Genetic test Results): [phone]    Metabolic Dietician:  Lisa Rodriguez, Registered Dietician: [phone]    Advanced Therapies Clinic Scheduler:  Khloe Dodson, [phone]    Copies to:     Beryl Glover  [address] [facility] [address]

## 2022-06-13 NOTE — LETTER
"2022      RE: Beryl Glover  [address] [facility] [address]     Dear Colleague,    Thank you for the opportunity to participate in the care of your patient, Beryl Glover, at the [facility] at [facility]. Please see a copy of my visit note below.                  Advanced Therapies  [facility] [address]  [address] [place] [address]   Phone: [phone]  Fax: [phone]  Date: 2022      Patient:  Beryl Glover   :   1990   MRN:     [id number]      Beryl Glover  [address] [facility] [address]    Dear Beryl Glover,    Thank you for sending Beryl Glover to the [facility] Monday \"Advanced Therapies Clinic\" for consultation and treatment of:    Fabry disease    PAST MEDICAL HISTORY:    From the oral history, and medical records that are available, these items are noted:    There is no problem list on file for this patient.      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 Beryl mentions that she has mild symptoms of Fabry disease up to this point in her life that do not interfere with her daily life. Beryl is not receiving any Fabry medication, and no biomarkers are available. She also reports that she is pregnant, and her MALIKA is 2022.  She reports pain and tingling in both of her feet in the evening about once per month and cramping and discomfort after eating everyday.  A genetic report shows no variations of unknown significance (VUS) or pathogenic variants are present.    Medications:  Current Outpatient Medications   Medication Sig     Prenatal Vit-Fe Fumarate-FA (PRENATAL MULTIVITAMIN  PLUS IRON) 27-1 MG TABS Take by mouth daily     No current facility-administered medications for this visit.       Allergies:  No Known Allergies    Physical " Examination:  There were no vitals taken for this visit.  Weight %tile:Facility age limit for growth percentiles is 20 years.  Height %tile: Facility age limit for growth percentiles is 20 years.  Head Circumference %tile: Facility age limit for growth percentiles is 20 years.  BMI %tile: No height and weight on file for this encounter.    PHYSICAL EXAMINATION:   General: The patient is oriented to person, place and time at an age-appropriate manner.   HEENT: The facial features are normal and symmetric. The ears are of normal position and configuration and hearing is grossly normal.  Neck: The neck appears to be supple with full range of motion  Chest: Does not appear to be tachypneic or in any respiratory distress.  Heart:  Beryl Glover  appears well perfused.  Abdomen: Not examined.  Extremities: The extremities are of normal configuration without contractures nor hyperlaxities.  Back: Not examined.   Integument: The visible part of the integument is of normal appearance without significant changes in pigmentation, birthmarks, or lesions.  Neuromuscular:  Mental Status Exam: Alert, awake. Fully oriented. No dysarthria, no dysphasia. Speech of normal fluency.  Appears to have normal tone and strength.     LABORATORY RESULTS: Laboratory studies from the past year were reviewed.     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 Biomarkers within normal ranges  3. Peripheral neuropathy       PLAN/RECOMMENDATIONS:  1. Discussed introns and exons  2. Once baby is born, obtain genetic testing, enzyme activity level and GL3 level  3. Follow up in 12 months      FOLLOW-UP INSTRUCTIONS FOR THE PATIENT:  If you are returning to clinic to review specific laboratory tests, please call the Genetic Counselor (see phone numbers below)  to confirm that we have received all of the results from  reference laboratories prior to your appointment. If we have not received all of the test results, please discuss re-scheduling your appointment.    With warmest regards,      Mukesh Witt Ph.D., M.D.  Professor of Pediatrics  Medical Director, Advanced Therapies Program  Medical Director, PKU and Maternal PKU Clinic    Appointments: [phone]      Monday mornings: Advanced Therapies for Lysosomal Diseases Clinic   Monday afternoons: PKU Clinic, Metabolism Clinic, and Genetics Clinic              [facility] laboratory: [phone]/ [phone]               [facility] laboratory: [phone]  Nurse Coordinator, Metabolism and Genetics:  Lacie Tom RN, [phone]    Pharmacotherapy Consultant:  Richard Quiroga, PharmD, Pharmacotherapy for Metabolic Disorders (PIMD): [phone]    Genetic Counselor:  Jocy Restrepo MS, [facility] (Genetic test Results): [phone]    Metabolic Dietician:  Lisa Rodriguez, Registered Dietician: [phone]    Advanced Therapies Clinic Scheduler:  Khloe Dodson, [phone]    Copies to:     Beryl Glover  [address] [facility] [address]

## 2022-06-13 NOTE — NURSING NOTE
Pt states they reviewed medications and allergies during e-check in on [employer].    Pt reported approximate weight and height for today's visit.    Lacie Denney VF

## 2022-10-03 ENCOUNTER — HEALTH MAINTENANCE LETTER (OUTPATIENT)
Age: 32
End: 2022-10-03

## 2023-05-20 ENCOUNTER — HEALTH MAINTENANCE LETTER (OUTPATIENT)
Age: 33
End: 2023-05-20

## 2023-06-27 ENCOUNTER — TELEPHONE (OUTPATIENT)
Dept: CONSULT | Facility: CLINIC | Age: 33
End: 2023-06-27
Payer: COMMERCIAL

## 2023-06-27 NOTE — TELEPHONE ENCOUNTER
I left Monalisa a voice message in regards of coordinating a follow-up virtual visit in the Advance Therapy Clinic to meet with Dr Witt.      To schedule an appointment, please contact Dr. Witt s coordinator, Khloe, at [phone]. She will be happy to help coordinate an appointment with you        Thank you,   Khloe Dodson

## 2023-12-27 ENCOUNTER — TELEPHONE (OUTPATIENT)
Dept: CONSULT | Facility: CLINIC | Age: 33
End: 2023-12-27
Payer: COMMERCIAL

## 2023-12-27 NOTE — TELEPHONE ENCOUNTER
I left Monalisa a voice message in regards of coordinating a follow-up virtual visit in the Advance Therapy Clinic to meet with Dr Witt. Monalisa is overdue for a six months follow up per Dr Witt's recommendation.     ?     To schedule an appointment, please contact Dr. Witt s coordinator, Khloe, at [phone]. She will be happy to help coordinate an appointment with you?     ?          Thank you,      Khloe Dodson  Specialty    [facility]? [place]?   [address] [place] [address]   [email or web address]   Office: [phone]

## 2024-07-27 ENCOUNTER — HEALTH MAINTENANCE LETTER (OUTPATIENT)
Age: 34
End: 2024-07-27

## 2025-07-01 ENCOUNTER — VIRTUAL VISIT (OUTPATIENT)
Dept: PEDIATRICS | Facility: CLINIC | Age: 35
End: 2025-07-01
Attending: PEDIATRICS
Payer: COMMERCIAL

## 2025-07-01 DIAGNOSIS — Z13.71 SCREENING FOR GENETIC DISEASE CARRIER STATUS: Primary | ICD-10-CM

## 2025-07-01 PROCEDURE — 99417 PROLNG OP E/M EACH 15 MIN: CPT | Performed by: PEDIATRICS

## 2025-07-01 PROCEDURE — 98007 SYNCH AUDIO-VIDEO EST HI 40: CPT | Performed by: PEDIATRICS

## 2025-07-01 RX ORDER — BACLOFEN 20 MG
500 TABLET ORAL DAILY
COMMUNITY

## 2025-07-01 RX ORDER — LISINOPRIL 5 MG/1
5 TABLET ORAL DAILY
COMMUNITY
Start: 2025-03-01

## 2025-07-01 RX ORDER — FLUOXETINE 10 MG/1
10 CAPSULE ORAL DAILY
COMMUNITY
Start: 2023-06-01

## 2025-07-01 NOTE — PROGRESS NOTES
"Monalisa is a 35 year old who is being evaluated via a billable video visit.    How would you like to obtain your AVS? MyChart  If the video visit is dropped, the invitation should be resent by: Text to cell phone: [phone]  Will anyone else be joining your video visit? Dr. Brittney Holly, PharmD, CSP. (Pharmacist), KAHLIL TrotterBS. (Research Physician), Dr. Petty Oliva (Pharmacist), and Dr. Jamari Schrader (Physician-in-Training)    {If patient encounters technical issues they should call [phone] :495695}  Video-Visit Details    Type of service:  Video Visit   Video Start Time: 12:18 pm  Video End Time: 1:08 pm  Originating Location (pt. Location): Home  {PROVIDER LOCATION On-site should be selected for visits conducted from your clinic location or adjoining [facility] hospital, academic office, or other nearby [facility] building. Off-site should be selected for all other provider locations, including home:[id number]}  Distant Location (provider location):  Off-site  Platform used for Video Visit: [facility]                  Advanced Therapies  [facility] [address]  [address] [place] [address]   Phone: [phone]  Fax: [phone]  Date: 2025      Patient:  Beryl Glover   :   1990   MRN:     [id number]      Beryl Glover  [address] [facility] [address]    Dear  No primary care provider on file. and Beryl AUSTIN Belen,    Thank you for sending Beryl Glover to the [facility] Monday \"Advanced Therapies Clinic\" for consultation and treatment of:    No diagnosis found.    PAST MEDICAL HISTORY:    From the oral history, and medical records that are available, these items are noted:    There is no problem list on file for this patient.      Monalisa Glover, a 35-year old female, came today for follow-up evaluation after her last visit in clinic in 2022. Since the last visit, she gave birth to her son in 2022 but reports no other medical changes, ER visits, or hospitalizations. "     Her background involves a comprehensive work-up for suspected Fabry disease. She was initially screened in [id number] for a lysosomal storage disorder due to the presence of angiokeratomas and tingling in her hands and feet. Normal alpha-galactosidase enzyme activity resulted on 11/18/2010 with repeat testing at [facility] in 2011. Lab testing from April 2022 after the initial visit with this clinic showed no pathogenic or likely pathogenic variant in the GLA gene along with normal Lyso-GL3 and Plasma-GL3 levels. 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 She does mention that her angiokeratomas have been histologically proven to be angiokeratomas and not angiomas. Notably, she has no family history of Fabry disease.     She also wants to discuss the likelihood of her son having a GLA gene variant based on the results from her testing.     Medications:  Current Outpatient Medications   Medication Sig Dispense Refill    cholecalciferol (VITAMIN D3) 25 mcg (1000 units) capsule Take 1 capsule by mouth daily.      FLUoxetine (PROZAC) 10 MG capsule Take 10 mg by mouth daily.      lisinopril (ZESTRIL) 5 MG tablet Take 5 mg by mouth daily.      Magnesium Oxide -Mg Supplement 500 MG TABS Take 500 mg by mouth daily.       No current facility-administered medications for this visit.       Allergies:  No Known Allergies    Physical Examination:  There were no vitals taken for this visit.  Weight %tile:Facility age limit for growth %amauri is 20 years.  Height %tile: Facility age limit for growth %amauri is 20 years.  Head Circumference %tile: Facility age limit for growth %amauri is 20 years.  BMI %tile: No height and weight on file for this encounter.    FAMILY HISTORY: A brief family medical history was reviewed.  REVIEW OF SYSTEMS: The review of systems negative for new eye, ear, heart, lung, liver, spleen, gastrointestinal,  "bone, muscle, integumentary, endocrinologic, brain or psychiatric issues except as noted above.  PHYSICAL EXAMINATION:   General: The patient is oriented to person, place and time at an age-appropriate manner.   HEENT: The facial features are normal and symmetric. The ears are of normal position and configuration and hearing is grossly normal.  Neck: The neck appears to have full range of motion  Chest: Does not appear to be tachypneic or in any respiratory distress.  Heart:  Beryl  appears well perfused.  Abdomen: Not examined.  Extremities: The extremities are of normal configuration without contractures nor hyperlaxities.  Back: Not examined.   Integument: The visible part of the integument is of normal appearance without significant changes in pigmentation, birthmarks, or lesions.  Neuromuscular:  Mental Status Exam: Alert, awake. Fully oriented. No dysarthria, no dysphasia. Speech of normal fluency.  Appears to have normal strength.    LABORATORY RESULTS: Laboratory studies from the past year were reviewed.    Reference Range 4/5/2022   Plasma GL3 1.37 - 4.04 microgram/mL 2.77 microgram/mL   Lyso GL3 < 0.3 ng/mL < 0.3 ng/mL     ASSESSMENT:  Fabry biomarkers within normal ranges with no variant in the GLA gene  Histologically-proven angiokeratomas  Peripheral neuropathy  PLAN/RECOMMENDATIONS:  Referral to Genetic Counselor, Jocy Chan, to investigate whole genome sequencing for Monalisa for a differential diagnosis due to angiokeratomas and peripheral neuropathy symptoms that cannot be explained by another cause.   Return to Advanced Therapies Clinic as needed after consultation with Genetic Counselor  {[facility] 2021 Documentation (Optional):409868}  {2021 E&M time (Optional):581193}  {Provider  Link to [facility] Help Grid :327377}      WORLDir  The patient was invited to the 12th annual \"WORLDFair\" meeting (\"We're Organizing Research on Lysosomal Diseases\"). Experts will be available to discuss the latest " information about lysosomal conditions and treatment: cystinosis, Fabry disease, Gaucher disease, Pompe disease, mucopolysaccharidosis (MPS) conditions, Connie-Pick disease, and others.    Annual WORLDFair 2025 Friday, September 19, 2025  [facility]  [email or web address]  [address] [place] [address]  [place]    Schedule  9:00 AM: Doors open (free admission to the meeting and arboretum)  10:00 AM: Welcome and presentations  11:00 PM:   11:30 AM Lunch break  12:45 PM Break-out Groups and one-to-one discussions  2:00 PM: Meeting adjourns. Tour the arboretum on your own!     WORLDSymposium  WORLD:  We're Organizing Research on Lysosomal Diseases   There will be an informational scientific meeting with international experts who are knowledgeable about your condition, the 22nd annual WORLDSymposium, [place], [place], [place] (February 1-5, 2026). For additional information, and for complementary (free) registration for patients and family members, go to internet URL  [email or web address]   FOLLOW-UP INSTRUCTIONS FOR THE PATIENT:  If you are returning to clinic to review specific laboratory tests, please call the Genetic Counselor (see phone numbers below)  to confirm that we have received all of the results from reference laboratories prior to your appointment. If we have not received all of the test results, please discuss re-scheduling your appointment.    With warmest regards,      Mukesh Witt Ph.D., M.D.  Professor of Pediatrics  Medical Director, Advanced Therapies Program  Medical Director, PKU and Maternal PKU Clinic    Appointments: [phone]      Monday mornings: Advanced Therapies for Lysosomal Diseases Clinic   Tuesday amornings: PKU Clinic, Metabolism Clinic, and Genetics Clinic              [facility] laboratory: [phone]/ [phone]               [facility] laboratory: [phone]    Nurse  Coordinator, Metabolism and Genetics:  Lacie Tom RN, [phone]    Pharmacotherapy Consultant:  Brittney Holly, PharmD, Pharmacotherapy for Metabolic Disorders (PIMD): [phone]    Genetic Counselor:  Jocy Restrepo MS, [facility] (Genetic test Results): [phone]    Metabolic Dietician:  Lisa Rodriguez, Registered Dietician: [phone]    Advanced Therapies Clinic Scheduler:  Khloe Dodson, [phone]    Copies to:      No primary care provider on file.  No primary provider on file.    Beryl Glover  [address] [facility] [address]     No referring provider defined for this encounter.

## 2025-07-01 NOTE — LETTER
"2025      RE: Beryl Glover  [address] [address]     Dear Colleague,    Thank you for the opportunity to participate in the care of your patient, Beryl Glover, at the [facility] at [facility]. Please see a copy of my visit note below.    Monalisa is a 35 year old who is being evaluated via a billable video visit.    How would you like to obtain your AVS? MyChart  If the video visit is dropped, the invitation should be resent by: Text to cell phone: [phone]  Will anyone else be joining your video visit? Dr. Brittney Holly, PharmD, CSP. (Pharmacist), EVERT Trotter. (Research Physician), Dr. Petty Oliva (Pharmacist), and Dr. Jamari Schrader (Physician-in-Training)    {If patient encounters technical issues they should call [phone] :[id number]}  Video-Visit Details    Type of service:  Video Visit   Video Start Time: 12:18 pm  Video End Time: 1:08 pm  Originating Location (pt. Location): Home  {PROVIDER LOCATION On-site should be selected for visits conducted from your clinic location or adjoining [facility] hospital, academic office, or other nearby [facility] building. Off-site should be selected for all other provider locations, including home:[id number]}  Distant Location (provider location):  Off-site  Platform used for Video Visit: [facility]                  Advanced [facility]rapies  [address] [address]   Phone: [phone]  Fax: [phone]  Date: 2025      Patient:  Beryl Glover   :   1990   MRN:     [id number]      Beryl Glovre  [address] [address]  Beryl Glover,    Thank you for sending Beryl Glover to the [facility] Monday \"Advanced Therapies Clinic\" for consultation and treatment of:    No diagnosis found.    PAST MEDICAL HISTORY:    From the oral history, and medical records that are available, these items are noted:    Fabry " disease    Monalisa Glover, a 35-year old female, came today for follow-up evaluation after her last visit in clinic in June 2022. Since the last visit, she gave birth to her son in August 2022 but reports no other medical changes, ER visits, or hospitalizations.     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 She was initially screened in [id number] for a lysosomal storage disorder due to the presence of angiokeratomas and tingling in her hands and feet. Normal alpha-galactosidase enzyme activity resulted on 11/18/2010 with repeat testing at [facility] in 2011. Lab testing from April 2022 after the initial visit with this clinic showed no pathogenic or likely pathogenic variant in the GLA gene along with normal Lyso-GL3 and Plasma-GL3 levels. 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 She does mention that her angiokeratomas have been histologically proven to be angiokeratomas and not angiomas. Notably, she has no family history of Fabry disease.     She also wants to discuss the likelihood of her son having a GLA gene variant based on the results from her testing.     Medications:  Current Outpatient Medications   Medication Sig Dispense Refill     cholecalciferol (VITAMIN D3) 25 mcg (1000 units) capsule Take 1 capsule by mouth daily.       FLUoxetine (PROZAC) 10 MG capsule Take 10 mg by mouth daily.       lisinopril (ZESTRIL) 5 MG tablet Take 5 mg by mouth daily.       Magnesium Oxide -Mg Supplement 500 MG TABS Take 500 mg by mouth daily.       No current facility-administered medications for this visit.       Allergies:  No Known Allergies    Physical Examination:  There were no vitals taken for this visit.  Weight %tile:Facility age limit for growth %amauri is 20 years.  Height %tile: Facility age limit for growth %amauri is 20 years.  Head Circumference %tile: Facility age limit for  growth %amauri is 20 years.  BMI %tile: No height and weight on file for this encounter.    FAMILY HISTORY: A brief family medical history was reviewed.  REVIEW OF SYSTEMS: The review of systems negative for new eye, ear, heart, lung, liver, spleen, gastrointestinal, bone, muscle, integumentary, endocrinologic, brain or psychiatric issues except as noted above.  PHYSICAL EXAMINATION:   General: The patient is oriented to person, place and time at an age-appropriate manner.   HEENT: The facial features are normal and symmetric. The ears are of normal position and configuration and hearing is grossly normal.  Neck: The neck appears to have full range of motion  Chest: Does not appear to be tachypneic or in any respiratory distress.  Heart:  Beryl  appears well perfused.  Abdomen: Not examined.  Extremities: The extremities are of normal configuration without contractures nor hyperlaxities.  Back: Not examined.   Integument: The visible part of the integument is of normal appearance without significant changes in pigmentation, birthmarks, or lesions.  Neuromuscular:  Mental Status Exam: Alert, awake. Fully oriented. No dysarthria, no dysphasia. Speech of normal fluency.  Appears to have normal strength.    LABORATORY RESULTS: Laboratory studies from the past year were reviewed.    Reference Range 4/5/2022   Plasma GL3 1.37 - 4.04 microgram/mL 2.77 microgram/mL   Lyso GL3 < 0.3 ng/mL < 0.3 ng/mL     ASSESSMENT:  Fabry biomarkers within normal ranges with no variant in the GLA gene  Histologically-proven angiokeratomas  Peripheral neuropathy  PLAN/RECOMMENDATIONS:  Referral to Genetic Counselor, Jocy Chan, to investigate whole genome sequencing for Monalisa for a differential diagnosis due to angiokeratomas and peripheral neuropathy symptoms that cannot be explained by another cause.   Return to [facility] Clinic as needed after consultation with Genetic Counselor  In addition to the time spent in virtual  "interaction with the patient Beryl Glover, I also spent 15 minutes consulting separately with the Clinical Pharmacologist discussing administration, monitoring, response, potential adverse effects and management, of the complex biologic medications for lysosomal conditions.     WORLDFair  The patient was invited to the 12th annual \"WORLDFair\" meeting (\"We're Organizing Research on Lysosomal Diseases\"). Experts will be available to discuss the latest information about lysosomal conditions and treatment: cystinosis, Fabry disease, Gaucher disease, Pompe disease, mucopolysaccharidosis (MPS) conditions, Connie-Pick disease, and others.    Annual WORLDFair 2025 Friday, September 19, 2025  [facility]  [email or web address].[place] [address] [employer] [place] [address]  [employer] \[facility]\""    Schedule  9:00 AM: Doors open (free admission to the meeting and arboretum)  10:00 AM: Welcome and presentations  11:00 PM:   11:30 AM Lunch break  12:45 PM Break-out Groups and one-to-one discussions  2:00 PM: Meeting adjourns. Tour the arboretum on your own!     WORLDSymposium  WORLD:  We're Organizing Research on Lysosomal Diseases   There will be an informational scientific meeting with international experts who are knowledgeable about your condition, the 22nd annual WORLDSymposium, [place], [place], [place] (February 1-5, 2026). For additional information, and for complementary (free) registration for patients and family members, go to internet URL  [email or web address]   FOLLOW-UP INSTRUCTIONS FOR THE PATIENT:  If you are returning to clinic to review specific laboratory tests, please call the Genetic Counselor (see phone numbers below)  to confirm that we have received all of the results from reference laboratories prior to your appointment. If we have not received all of the test results, please discuss re-scheduling your appointment.    With warmest regards,      Mukesh GALVIN" Miryam Ph.D., M.D.  Professor of Pediatrics  Medical Director, Advanced Therapies Program  Medical Director, PKU and Maternal PKU Clinic    Appointments: [phone]      Monday mornings: [facility]ies for Lysosomal Diseases Clinic   Tuesday amornings: PKU Clinic, Metabolism Clinic, and Genetics Clinic              [facility] laboratory: [phone]/ [phone]               [facility] laboratory: [phone]    Nurse Coordinator, Metabolism and Genetics:  Lacie Tom RN, [phone]    Pharmacotherapy Consultant:  Brittney Holly, PharmD, Pharmacotherapy for Metabolic Disorders (PIMD): [phone]    Genetic Counselor:  Jocy Restrepo MS, [facility] (Genetic test Results): [phone]    Metabolic Dietician:  Lisa Rodriguez, Registered Dietician: [phone]    [facility] Clinic Scheduler:  Khloe Dodson, [phone]    Copies to:     Beryl Glover  [address] [address]      Please do not hesitate to contact me if you have any questions/concerns.     Sincerely,       Jose Witt MD

## 2025-07-01 NOTE — PATIENT INSTRUCTIONS
"Pediatric Metabolism/PKU Clinic  [facility]  Pediatric Specialty Clinic ([facility])      Formula   We did not make any changes to your formula today.   We will review the lab results and call you with our recommendations.  *Do not make any formula changes without first speaking to your dietician or doctor.       Medications   We did not make any changes to your medications today. We will review the lab results and call you with our recommendations.  *Do not make any medication changes without first speaking to your doctor.  **Please contact us at least one week in advance during regular business hours if you are about to run out of formula or medication       Emergency & Sick Calls   Keep your emergency letter with your child at all times  (at their school, in your vehicles, purse/bag and home, etc).  Consider making a medical alert bracelet.    If your child is unresponsive or has other life threatening medical emergency YOU SHOULD CALL 911.     If your child is NOT ACTING NORMALLY such as: confused or sleepier than normal, having nausea or vomiting, not tolerating their formula or medications, breathing faster than normal, has a fever, diarrhea, or other parental concern CALL US IMMEDIATELY.     Call [phone] and ask the  to \"page Genetic Metabolic Physician on-call\"   If no one calls you back within 15 minutes call again.        Helpful Numbers   To schedule appointments:  Pediatric Call Center for [facility]: [phone]  Neuropsychology [phone]   Radiology/ Imaging/ Echocardiogram: [phone]   Services:   [phone]     For questions about medications/ supplies or non-urgent medical concerns:        Lacie BRANCH, RN, PHN  Nurse Care Coordinator               Ph: [phone]        Rosemarie Contreras APRN, CNP             Ph: [phone]    For questions about your child's nutrition:  Lisa Rodriguez RD  Ph: [phone]    For " questions about genetic counseling:                    If you have not already done so consider signing up for [employer] by speaking with the person at the  on your way out or go to [email or web address] to sign up online.      You should receive a phone call about your next appointment. If you do not receive this within two weeks of your visit, please call [phone].

## 2025-07-10 ENCOUNTER — TELEPHONE (OUTPATIENT)
Dept: CONSULT | Facility: CLINIC | Age: 35
End: 2025-07-10
Payer: COMMERCIAL

## 2025-07-10 NOTE — TELEPHONE ENCOUNTER
LVM for patient to call back to schedule GC only visit with Jocy Chan. My direct number provided. Will also send message via [employer].

## 2025-08-07 ENCOUNTER — VIRTUAL VISIT (OUTPATIENT)
Dept: CONSULT | Facility: CLINIC | Age: 35
End: 2025-08-07
Attending: PEDIATRICS
Payer: COMMERCIAL

## 2025-08-07 DIAGNOSIS — G62.9 PERIPHERAL NEUROPATHY: ICD-10-CM

## 2025-08-07 DIAGNOSIS — Z13.71 SCREENING FOR GENETIC DISEASE CARRIER STATUS: ICD-10-CM

## 2025-08-07 DIAGNOSIS — Z71.83 ENCOUNTER FOR NONPROCREATIVE GENETIC COUNSELING AND TESTING: Primary | ICD-10-CM

## 2025-08-07 DIAGNOSIS — D23.9 ANGIOKERATOMA: ICD-10-CM

## 2025-08-07 DIAGNOSIS — Z13.71 ENCOUNTER FOR NONPROCREATIVE GENETIC COUNSELING AND TESTING: Primary | ICD-10-CM

## 2025-08-07 PROCEDURE — 96041 GENETIC COUNSELING SVC EA 30: CPT | Mod: GT,95 | Performed by: GENETIC COUNSELOR, MS

## 2025-08-10 ENCOUNTER — HEALTH MAINTENANCE LETTER (OUTPATIENT)
Age: 35
End: 2025-08-10